# Patient Record
Sex: MALE | Race: WHITE | NOT HISPANIC OR LATINO | Employment: FULL TIME | URBAN - METROPOLITAN AREA
[De-identification: names, ages, dates, MRNs, and addresses within clinical notes are randomized per-mention and may not be internally consistent; named-entity substitution may affect disease eponyms.]

---

## 2017-08-28 ENCOUNTER — OFFICE VISIT (OUTPATIENT)
Dept: URGENT CARE | Facility: PHYSICIAN GROUP | Age: 22
End: 2017-08-28
Payer: COMMERCIAL

## 2017-08-28 VITALS
DIASTOLIC BLOOD PRESSURE: 68 MMHG | WEIGHT: 145 LBS | HEART RATE: 84 BPM | RESPIRATION RATE: 16 BRPM | BODY MASS INDEX: 21.98 KG/M2 | TEMPERATURE: 98.5 F | OXYGEN SATURATION: 96 % | SYSTOLIC BLOOD PRESSURE: 100 MMHG | HEIGHT: 68 IN

## 2017-08-28 DIAGNOSIS — K04.7 DENTAL INFECTION: ICD-10-CM

## 2017-08-28 PROCEDURE — 99214 OFFICE O/P EST MOD 30 MIN: CPT | Performed by: PHYSICIAN ASSISTANT

## 2017-08-28 RX ORDER — AMOXICILLIN 875 MG/1
875 TABLET, COATED ORAL 2 TIMES DAILY
Qty: 14 TAB | Refills: 0 | Status: SHIPPED | OUTPATIENT
Start: 2017-08-28 | End: 2017-09-04

## 2017-08-28 ASSESSMENT — ENCOUNTER SYMPTOMS
WHEEZING: 0
SHORTNESS OF BREATH: 0
COUGH: 0
EYE PAIN: 0
FEVER: 0
EYE DISCHARGE: 0
CHILLS: 0
PALPITATIONS: 0
HEADACHES: 1
EYE REDNESS: 0

## 2017-08-29 NOTE — PROGRESS NOTES
"Subjective:      Seng Holt is a 21 y.o. male who presents with Tooth Ache (C/o tooth pain, swelling x1 day.  Slight chills)            Dental Pain    This is a new problem. The current episode started today. The problem occurs constantly. The problem has been unchanged. The pain is moderate. Associated symptoms include thermal sensitivity. Pertinent negatives include no fever. He has tried nothing for the symptoms. The treatment provided moderate relief.       Review of Systems   Constitutional: Positive for malaise/fatigue. Negative for chills and fever.   HENT:        Tooth pain     Eyes: Negative for pain, discharge and redness.   Respiratory: Negative for cough, shortness of breath and wheezing.    Cardiovascular: Negative for chest pain and palpitations.   Neurological: Positive for headaches.   All other systems reviewed and are negative.    PMH:  has no past medical history on file.  MEDS:   Current Outpatient Prescriptions:   •  amoxicillin (AMOXIL) 875 MG tablet, Take 1 Tab by mouth 2 times a day for 7 days., Disp: 14 Tab, Rfl: 0  •  naproxen (NAPROSYN) 500 MG Tab, Take 1 Tab by mouth 2 times a day, with meals., Disp: 60 Tab, Rfl: 1  ALLERGIES: No Known Allergies  SURGHX: No past surgical history on file.  SOCHX:  reports that he has been smoking Cigarettes.  He has been smoking about 1.00 pack per day. He has never used smokeless tobacco. He reports that he drinks alcohol. He reports that he does not use drugs.  FH: Family history was reviewed, no pertinent findings to report  Medications, Allergies, and current problem list reviewed today in Epic       Objective:     /68   Pulse 84   Temp 36.9 °C (98.5 °F)   Resp 16   Ht 1.727 m (5' 8\")   Wt 65.8 kg (145 lb)   SpO2 96%   BMI 22.05 kg/m²      Physical Exam   Constitutional: He is oriented to person, place, and time. Vital signs are normal. He appears well-developed and well-nourished.   HENT:   Head: Normocephalic and atraumatic. "   Right Ear: Hearing, tympanic membrane, external ear and ear canal normal.   Left Ear: Hearing, tympanic membrane, external ear and ear canal normal.   Nose: Mucosal edema and rhinorrhea present. No sinus tenderness. No epistaxis. Right sinus exhibits maxillary sinus tenderness. Left sinus exhibits maxillary sinus tenderness.   Mouth/Throat: Uvula is midline, oropharynx is clear and moist and mucous membranes are normal. Dental abscesses and dental caries present.       Neck: Normal range of motion. Neck supple.   Cardiovascular: Normal rate, regular rhythm, normal heart sounds and intact distal pulses.    Pulmonary/Chest: Effort normal and breath sounds normal.   Neurological: He is alert and oriented to person, place, and time.   Skin: Skin is warm and dry.   Psychiatric: He has a normal mood and affect. His behavior is normal.   Vitals reviewed.              Assessment/Plan:     1. Dental infection  - amoxicillin    Differential diagnosis, natural history, supportive care, and indications for immediate follow-up discussed at length.   Follow-up with dentist 4-5 days, emergency room precautions discussed.  Patient and/or family appears understanding of information.

## 2018-01-08 ENCOUNTER — NON-PROVIDER VISIT (OUTPATIENT)
Dept: URGENT CARE | Facility: PHYSICIAN GROUP | Age: 23
End: 2018-01-08

## 2018-01-08 DIAGNOSIS — Z02.1 PRE-EMPLOYMENT DRUG SCREENING: ICD-10-CM

## 2018-01-08 LAB
AMP AMPHETAMINE: NORMAL
COC COCAINE: NORMAL
INT CON NEG: NORMAL
INT CON POS: NORMAL
MET METHAMPHETAMINES: NORMAL
OPI OPIATES: NORMAL
PCP PHENCYCLIDINE: NORMAL
POC DRUG COMMENT 753798-OCCUPATIONAL HEALTH: NEGATIVE
THC: NORMAL

## 2018-01-08 PROCEDURE — 80305 DRUG TEST PRSMV DIR OPT OBS: CPT | Performed by: PHYSICIAN ASSISTANT

## 2018-10-03 ENCOUNTER — OFFICE VISIT (OUTPATIENT)
Dept: URGENT CARE | Facility: PHYSICIAN GROUP | Age: 23
End: 2018-10-03
Payer: COMMERCIAL

## 2018-10-03 VITALS
TEMPERATURE: 98.4 F | WEIGHT: 150 LBS | BODY MASS INDEX: 22.73 KG/M2 | SYSTOLIC BLOOD PRESSURE: 96 MMHG | DIASTOLIC BLOOD PRESSURE: 64 MMHG | HEART RATE: 75 BPM | HEIGHT: 68 IN | OXYGEN SATURATION: 96 %

## 2018-10-03 DIAGNOSIS — B35.3 TINEA PEDIS OF BOTH FEET: ICD-10-CM

## 2018-10-03 PROCEDURE — 99214 OFFICE O/P EST MOD 30 MIN: CPT | Performed by: NURSE PRACTITIONER

## 2018-10-03 RX ORDER — NAFTIFINE HYDROCHLORIDE 20 MG/G
1 GEL TOPICAL 2 TIMES DAILY
Qty: 1 TUBE | Refills: 0 | Status: SHIPPED | OUTPATIENT
Start: 2018-10-03 | End: 2022-01-26

## 2018-10-03 RX ORDER — PRENATAL VIT 91/IRON/FOLIC/DHA 28-975-200
COMBINATION PACKAGE (EA) ORAL
Qty: 15 G | Refills: 0 | Status: SHIPPED | OUTPATIENT
Start: 2018-10-03 | End: 2022-01-26

## 2018-10-03 RX ORDER — KETOCONAZOLE 20 MG/G
1 CREAM TOPICAL DAILY
Qty: 1 TUBE | Refills: 0 | Status: SHIPPED | OUTPATIENT
Start: 2018-10-03 | End: 2018-10-03 | Stop reason: SDUPTHER

## 2018-10-03 RX ORDER — KETOCONAZOLE 20 MG/G
1 CREAM TOPICAL DAILY
Qty: 1 TUBE | Refills: 0 | Status: SHIPPED | OUTPATIENT
Start: 2018-10-03 | End: 2022-01-26

## 2018-10-03 ASSESSMENT — ENCOUNTER SYMPTOMS
NAUSEA: 0
CHILLS: 0
SORE THROAT: 0
VOMITING: 0
DIZZINESS: 0
MYALGIAS: 0
SHORTNESS OF BREATH: 0
FEVER: 0
EYE PAIN: 0
ANOREXIA: 0

## 2018-10-03 NOTE — PROGRESS NOTES
Subjective:     Seng Holt is a 22 y.o. male who presents for Rash (Multiple abrasions on both feet for 1 month. Possible cause from work boots. c/o itching, burning and oozing and bleeding. )  Patient presents clinic today for evaluation on both his feet is present for over a month. Patient wears work boots that exacerbate symptoms. They have been itching, burning, oozing. He is applied Vaseline with no relief in symptoms.     Rash   This is a new problem. The current episode started more than 1 month ago. The problem is unchanged. The affected locations include the left foot and right foot. The rash is characterized by itchiness and redness. He was exposed to nothing. Pertinent negatives include no anorexia, eye pain, fever, joint pain, shortness of breath, sore throat or vomiting. Past treatments include anti-itch cream. The treatment provided no relief. There is no history of allergies.   History reviewed. No pertinent past medical history.History reviewed. No pertinent surgical history.  Social History     Social History   • Marital status: Single     Spouse name: N/A   • Number of children: N/A   • Years of education: N/A     Occupational History   • Not on file.     Social History Main Topics   • Smoking status: Current Every Day Smoker     Packs/day: 1.00     Types: Cigarettes   • Smokeless tobacco: Never Used   • Alcohol use Yes      Comment: occasional   • Drug use: No   • Sexual activity: Yes     Partners: Female     Other Topics Concern   • Not on file     Social History Narrative   • No narrative on file    History reviewed. No pertinent family history. Review of Systems   Constitutional: Negative for chills and fever.   HENT: Negative for sore throat.    Eyes: Negative for pain.   Respiratory: Negative for shortness of breath.    Cardiovascular: Negative for chest pain.   Gastrointestinal: Negative for anorexia, nausea and vomiting.   Genitourinary: Negative for hematuria.   Musculoskeletal:  "Negative for joint pain and myalgias.   Skin: Positive for itching and rash.   Neurological: Negative for dizziness.   No Known Allergies   Objective:   BP (!) 96/64 (BP Location: Left arm, Patient Position: Sitting, BP Cuff Size: Adult)   Pulse 75   Temp 36.9 °C (98.4 °F) (Temporal)   Ht 1.727 m (5' 8\")   Wt 68 kg (150 lb)   SpO2 96%   BMI 22.81 kg/m²   Physical Exam   Constitutional: He is oriented to person, place, and time. He appears well-developed and well-nourished. No distress.   HENT:   Head: Normocephalic and atraumatic.   Eyes: Pupils are equal, round, and reactive to light. Conjunctivae and EOM are normal.   Cardiovascular: Normal rate and regular rhythm.    No murmur heard.  Pulmonary/Chest: Effort normal and breath sounds normal. No respiratory distress.   Abdominal: Soft. He exhibits no distension. There is no tenderness.   Neurological: He is alert and oriented to person, place, and time. He has normal reflexes. No sensory deficit.   Skin: Skin is warm and dry. Abrasion and rash noted.        Psychiatric: He has a normal mood and affect.         Assessment/Plan:   Assessment    1. Tinea pedis of both feet  - ketoconazole (NIZORAL) 2 % Cream; Apply 1 Application to affected area(s) every day.  Dispense: 1 Tube; Refill: 0  - Naftifine HCl 2 % Gel; 1 Application by Apply externally route 2 Times a Day.  Dispense: 1 Tube; Refill: 0    Differential diagnosis, natural history, supportive care, and indications for immediate follow-up discussed.  "

## 2018-10-03 NOTE — LETTER
October 3, 2018         Patient: Seng Holt   YOB: 1995   Date of Visit: 10/3/2018           To Whom it May Concern:    Seng Holt was seen in my clinic on 10/3/2018. He may return to work on 10/05/18.    If you have any questions or concerns, please don't hesitate to call.        Sincerely,           YOVANA Rowe  Electronically Signed

## 2019-01-04 ENCOUNTER — APPOINTMENT (OUTPATIENT)
Dept: URGENT CARE | Facility: PHYSICIAN GROUP | Age: 24
End: 2019-01-04
Payer: COMMERCIAL

## 2019-01-05 ENCOUNTER — OFFICE VISIT (OUTPATIENT)
Dept: URGENT CARE | Facility: PHYSICIAN GROUP | Age: 24
End: 2019-01-05
Payer: COMMERCIAL

## 2019-01-05 VITALS
DIASTOLIC BLOOD PRESSURE: 76 MMHG | SYSTOLIC BLOOD PRESSURE: 114 MMHG | HEART RATE: 68 BPM | BODY MASS INDEX: 23.42 KG/M2 | WEIGHT: 154 LBS | RESPIRATION RATE: 16 BRPM | TEMPERATURE: 98.9 F | OXYGEN SATURATION: 98 %

## 2019-01-05 DIAGNOSIS — L03.011 PARONYCHIA OF RIGHT INDEX FINGER: Primary | ICD-10-CM

## 2019-01-05 DIAGNOSIS — J02.9 SORE THROAT: ICD-10-CM

## 2019-01-05 DIAGNOSIS — J02.0 STREP PHARYNGITIS: ICD-10-CM

## 2019-01-05 LAB
INT CON NEG: NORMAL
INT CON POS: NORMAL
S PYO AG THROAT QL: POSITIVE

## 2019-01-05 PROCEDURE — 99214 OFFICE O/P EST MOD 30 MIN: CPT | Performed by: INTERNAL MEDICINE

## 2019-01-05 PROCEDURE — 87880 STREP A ASSAY W/OPTIC: CPT | Performed by: INTERNAL MEDICINE

## 2019-01-05 RX ORDER — CEPHALEXIN 500 MG/1
500 CAPSULE ORAL 4 TIMES DAILY
Qty: 40 CAP | Refills: 0 | Status: SHIPPED | OUTPATIENT
Start: 2019-01-05 | End: 2019-01-15

## 2019-01-05 ASSESSMENT — ENCOUNTER SYMPTOMS
PSYCHIATRIC NEGATIVE: 1
SENSORY CHANGE: 0
NEUROLOGICAL NEGATIVE: 1
SHORTNESS OF BREATH: 0
MUSCULOSKELETAL NEGATIVE: 1
TINGLING: 0
DIZZINESS: 0
EYES NEGATIVE: 1
COUGH: 1
CONSTITUTIONAL NEGATIVE: 1
FOCAL WEAKNESS: 0
GASTROINTESTINAL NEGATIVE: 1
SORE THROAT: 1
CARDIOVASCULAR NEGATIVE: 1
FEVER: 0
WEAKNESS: 0

## 2019-01-05 NOTE — PROGRESS NOTES
Subjective:     Seng Holt is a 23 y.o. male who presents for Finger Pain (infection right hand ) and Pharyngitis (x 2 weeks )       Pharyngitis    This is a new problem. There has been no fever. Associated symptoms include coughing. Pertinent negatives include no shortness of breath.   The patient is a 23-year-old male who comes into urgent care today with a chief complaint of pharyngitis times 2 weeks which has gradually been worsening.  Associated symptoms include a cough.  Patient has not tried any medication for the pain.  Patient states his girlfriend also was having a sore throat.    Patient also reporting a possible infection in his right hand.  States he was using new gloves and started to notice pain swelling and redness at the tip of his index finger surrounding his fingernail.  Patient reports that there has been some purulent drainage seeping out from the side of his fingernail.  Reports the pain is severe. Denies numbness, tingling, or focal weakness.    PMH:  has no past medical history on file.    MEDS:   Current Outpatient Prescriptions:   •  cephALEXin (KEFLEX) 500 MG Cap, Take 1 Cap by mouth 4 times a day for 10 days., Disp: 40 Cap, Rfl: 0  •  Naftifine HCl 2 % Gel, 1 Application by Apply externally route 2 Times a Day. (Patient not taking: Reported on 1/5/2019), Disp: 1 Tube, Rfl: 0  •  terbinafine (LAMISIL) 1 % cream, Apply twice a day to affected areas for 1-3 weeks. (Patient not taking: Reported on 1/5/2019), Disp: 15 g, Rfl: 0  •  ketoconazole (NIZORAL) 2 % Cream, Apply 1 Application to affected area(s) every day. (Patient not taking: Reported on 1/5/2019), Disp: 1 Tube, Rfl: 0    ALLERGIES: No Known Allergies    SURGHX: No past surgical history on file.    SOCHX:  reports that he has been smoking Cigarettes.  He has been smoking about 1.00 pack per day. He has never used smokeless tobacco. He reports that he drinks alcohol. He reports that he does not use drugs.    FH: Reviewed with  patient, not pertinent to this visit.     Review of Systems   Constitutional: Negative.  Negative for fever and malaise/fatigue.   HENT: Positive for sore throat.    Eyes: Negative.    Respiratory: Positive for cough. Negative for shortness of breath.    Cardiovascular: Negative.  Negative for chest pain.   Gastrointestinal: Negative.    Genitourinary: Negative.    Musculoskeletal: Negative.    Skin:        Redness, swelling, tenderness surrounding fingernail of R index finger   Neurological: Negative.  Negative for dizziness, tingling, sensory change, focal weakness and weakness.   Psychiatric/Behavioral: Negative.    All other systems reviewed and are negative.    Objective:     /76   Pulse 68   Temp 37.2 °C (98.9 °F) (Temporal)   Resp 16   Wt 69.9 kg (154 lb)   SpO2 98%   BMI 23.42 kg/m²     Physical Exam   Constitutional: He is oriented to person, place, and time. He appears well-developed and well-nourished. He is cooperative. No distress.   HENT:   Head: Normocephalic.   Right Ear: Tympanic membrane normal.   Left Ear: Tympanic membrane normal.   Nose: Nose normal.   Mouth/Throat: Uvula is midline and mucous membranes are normal. Posterior oropharyngeal edema and posterior oropharyngeal erythema present. No oropharyngeal exudate. Tonsils are 1+ on the right. Tonsils are 1+ on the left.   Eyes: Conjunctivae and EOM are normal.   Neck: Normal range of motion.   Cardiovascular: Normal rate, regular rhythm, normal heart sounds and normal pulses.    Pulmonary/Chest: Effort normal and breath sounds normal. No respiratory distress.   Abdominal: Soft. Bowel sounds are normal.   Musculoskeletal: Normal range of motion. He exhibits no deformity.   Lymphadenopathy:     He has no cervical adenopathy.   Neurological: He is alert and oriented to person, place, and time. He has normal strength. No sensory deficit.   Skin: Skin is warm and dry. Capillary refill takes less than 2 seconds.   Erythema, warmth,  tenderness, swelling with small area of fluctuance surrounding fingernail of R index finger, no drainage   Psychiatric: He has a normal mood and affect.   Vitals reviewed.    Rapid strep A swab: positive       Assessment/Plan:     1. Paronychia of right index finger  - cephALEXin (KEFLEX) 500 MG Cap; Take 1 Cap by mouth 4 times a day for 10 days.  Dispense: 40 Cap; Refill: 0    2. Strep pharyngitis  - cephALEXin (KEFLEX) 500 MG Cap; Take 1 Cap by mouth 4 times a day for 10 days.  Dispense: 40 Cap; Refill: 0    3. Sore throat  - POCT Rapid Strep A    I&D was recommended but patient declines at this time as he is currently on the time constraint. Patient advised to monitor his finger and to return if his symptoms worsen.    Discussed supportive measures including increasing fluids and rest as well as OTC symptom management including acetaminophen and/or ibuprofen PRN pain and/or fever. Avoid close oral contact. Rx sent electronically for Keflex.    Patient advised to: Return for 1) Symptoms don't improve or worsen, or go to ER, 2) Follow up with primary care in 7-10 days.    Differential diagnosis, natural history, supportive care, and indications for immediate follow-up discussed. All questions answered. Patient agrees with the plan of care.    Case and results reviewed and agree with treatment plan as outlined.  Dr. Calle

## 2019-01-05 NOTE — PATIENT INSTRUCTIONS
Paronychia  Introduction  Paronychia is an infection of the skin that surrounds a nail. It usually affects the skin around a fingernail, but it may also occur near a toenail. It often causes pain and swelling around the nail. This condition may come on suddenly or develop over a longer period. In some cases, a collection of pus (abscess) can form near or under the nail. Usually, paronychia is not serious and it clears up with treatment.  What are the causes?  This condition may be caused by bacteria or fungi. It is commonly caused by either Streptococcus or Staphylococcus bacteria. The bacteria or fungi often cause the infection by getting into the affected area through an opening in the skin, such as a cut or a hangnail.  What increases the risk?  This condition is more likely to develop in:  · People who get their hands wet often, such as those who work as dishwashers, , or nurses.  · People who bite their fingernails or suck their thumbs.  · People who trim their nails too short.  · People who have hangnails or injured fingertips.  · People who get manicures.  · People who have diabetes.  What are the signs or symptoms?  Symptoms of this condition include:  · Redness and swelling of the skin near the nail.  · Tenderness around the nail when you touch the area.  · Pus-filled bumps under the cuticle. The cuticle is the skin at the base or sides of the nail.  · Fluid or pus under the nail.  · Throbbing pain in the area.  How is this diagnosed?  This condition is usually diagnosed with a physical exam. In some cases, a sample of pus may be taken from an abscess to be tested in a lab. This can help to determine what type of bacteria or fungi is causing the condition.  How is this treated?  Treatment for this condition depends on the cause and severity of the condition. If the condition is mild, it may clear up on its own in a few days. Your health care provider may recommend soaking the affected area in warm  water a few times a day. When treatment is needed, the options may include:  · Antibiotic medicine, if the condition is caused by a bacterial infection.  · Antifungal medicine, if the condition is caused by a fungal infection.  · Incision and drainage, if an abscess is present. In this procedure, the health care provider will cut open the abscess so the pus can drain out.  Follow these instructions at home:  · Soak the affected area in warm water if directed to do so by your health care provider. You may be told to do this for 20 minutes, 2-3 times a day. Keep the area dry in between soakings.  · Take medicines only as directed by your health care provider.  · If you were prescribed an antibiotic medicine, finish all of it even if you start to feel better.  · Keep the affected area clean.  · Do not try to drain a fluid-filled bump yourself.  · If you will be washing dishes or performing other tasks that require your hands to get wet, wear rubber gloves. You should also wear gloves if your hands might come in contact with irritating substances, such as  or chemicals.  · Follow your health care provider's instructions about:  ¨ Wound care.  ¨ Bandage (dressing) changes and removal.  Contact a health care provider if:  · Your symptoms get worse or do not improve with treatment.  · You have a fever or chills.  · You have redness spreading from the affected area.  · You have continued or increased fluid, blood, or pus coming from the affected area.  · Your finger or knuckle becomes swollen or is difficult to move.  This information is not intended to replace advice given to you by your health care provider. Make sure you discuss any questions you have with your health care provider.  Document Released: 06/13/2002 Document Revised: 05/25/2017 Document Reviewed: 11/25/2015  © 2017 [employer]  Strep Throat  Strep throat is a bacterial infection of the throat. Your health care provider may call the infection  tonsillitis or pharyngitis, depending on whether there is swelling in the tonsils or at the back of the throat. Strep throat is most common during the cold months of the year in children who are 5-15 years of age, but it can happen during any season in people of any age. This infection is spread from person to person (contagious) through coughing, sneezing, or close contact.  What are the causes?  Strep throat is caused by the bacteria called Streptococcus pyogenes.  What increases the risk?  This condition is more likely to develop in:  · People who spend time in crowded places where the infection can spread easily.  · People who have close contact with someone who has strep throat.  What are the signs or symptoms?  Symptoms of this condition include:  · Fever or chills.  · Redness, swelling, or pain in the tonsils or throat.  · Pain or difficulty when swallowing.  · White or yellow spots on the tonsils or throat.  · Swollen, tender glands in the neck or under the jaw.  · Red rash all over the body (rare).  How is this diagnosed?  This condition is diagnosed by performing a rapid strep test or by taking a swab of your throat (throat culture test). Results from a rapid strep test are usually ready in a few minutes, but throat culture test results are available after one or two days.  How is this treated?  This condition is treated with antibiotic medicine.  Follow these instructions at home:  Medicines  · Take over-the-counter and prescription medicines only as told by your health care provider.  · Take your antibiotic as told by your health care provider. Do not stop taking the antibiotic even if you start to feel better.  · Have family members who also have a sore throat or fever tested for strep throat. They may need antibiotics if they have the strep infection.  Eating and drinking  · Do not share food, drinking cups, or personal items that could cause the infection to spread to other people.  · If swallowing is  difficult, try eating soft foods until your sore throat feels better.  · Drink enough fluid to keep your urine clear or pale yellow.  General instructions  · Gargle with a salt-water mixture 3-4 times per day or as needed. To make a salt-water mixture, completely dissolve ½-1 tsp of salt in 1 cup of warm water.  · Make sure that all household members wash their hands well.  · Get plenty of rest.  · Stay home from school or work until you have been taking antibiotics for 24 hours.  · Keep all follow-up visits as told by your health care provider. This is important.  Contact a health care provider if:  · The glands in your neck continue to get bigger.  · You develop a rash, cough, or earache.  · You cough up a thick liquid that is green, yellow-brown, or bloody.  · You have pain or discomfort that does not get better with medicine.  · Your problems seem to be getting worse rather than better.  · You have a fever.  Get help right away if:  · You have new symptoms, such as vomiting, severe headache, stiff or painful neck, chest pain, or shortness of breath.  · You have severe throat pain, drooling, or changes in your voice.  · You have swelling of the neck, or the skin on the neck becomes red and tender.  · You have signs of dehydration, such as fatigue, dry mouth, and decreased urination.  · You become increasingly sleepy, or you cannot wake up completely.  · Your joints become red or painful.  This information is not intended to replace advice given to you by your health care provider. Make sure you discuss any questions you have with your health care provider.  Document Released: 12/15/2001 Document Revised: 08/16/2017 Document Reviewed: 04/11/2016  Elsevier Interactive Patient Education © 2017 [employer]

## 2019-08-26 ENCOUNTER — NON-PROVIDER VISIT (OUTPATIENT)
Dept: URGENT CARE | Facility: PHYSICIAN GROUP | Age: 24
End: 2019-08-26

## 2019-08-26 DIAGNOSIS — Z02.1 PRE-EMPLOYMENT DRUG SCREENING: ICD-10-CM

## 2019-08-26 LAB
AMP AMPHETAMINE: NORMAL
COC COCAINE: NORMAL
INT CON NEG: NORMAL
INT CON POS: NORMAL
MET METHAMPHETAMINES: NORMAL
OPI OPIATES: NORMAL
PCP PHENCYCLIDINE: NORMAL
POC DRUG COMMENT 753798-OCCUPATIONAL HEALTH: NORMAL
THC: NORMAL

## 2019-08-26 PROCEDURE — 80305 DRUG TEST PRSMV DIR OPT OBS: CPT | Performed by: PHYSICIAN ASSISTANT

## 2020-11-18 ENCOUNTER — NON-PROVIDER VISIT (OUTPATIENT)
Dept: URGENT CARE | Facility: PHYSICIAN GROUP | Age: 25
End: 2020-11-18

## 2020-11-18 DIAGNOSIS — Z02.1 PRE-EMPLOYMENT DRUG SCREENING: ICD-10-CM

## 2020-11-18 PROCEDURE — 80305 DRUG TEST PRSMV DIR OPT OBS: CPT | Performed by: FAMILY MEDICINE

## 2022-01-26 ENCOUNTER — OFFICE VISIT (OUTPATIENT)
Dept: URGENT CARE | Facility: PHYSICIAN GROUP | Age: 27
End: 2022-01-26
Payer: MEDICAID

## 2022-01-26 VITALS
RESPIRATION RATE: 16 BRPM | DIASTOLIC BLOOD PRESSURE: 62 MMHG | SYSTOLIC BLOOD PRESSURE: 116 MMHG | BODY MASS INDEX: 22.05 KG/M2 | WEIGHT: 154 LBS | OXYGEN SATURATION: 97 % | HEART RATE: 80 BPM | TEMPERATURE: 98.6 F | HEIGHT: 70 IN

## 2022-01-26 DIAGNOSIS — T15.91XA FOREIGN BODY OF RIGHT EYE, INITIAL ENCOUNTER: ICD-10-CM

## 2022-01-26 DIAGNOSIS — H18.891 CORNEAL RUST RING OF RIGHT EYE: ICD-10-CM

## 2022-01-26 PROCEDURE — 99203 OFFICE O/P NEW LOW 30 MIN: CPT | Performed by: FAMILY MEDICINE

## 2022-01-26 ASSESSMENT — ENCOUNTER SYMPTOMS
WEIGHT LOSS: 0
MYALGIAS: 0
VOMITING: 0
NAUSEA: 0

## 2022-01-27 NOTE — PROGRESS NOTES
"Subjective     Seng Holt is a 26 y.o. male who presents with Eye Problem (x -RT eye , something in the eye-)            3-4 days right eye FB sensation/possible metal from grinding. Some itching. No contact lens use. +clear drainage. +photophobia. No vision loss. No other aggravating or alleviating factors.        Review of Systems   Constitutional: Negative for malaise/fatigue and weight loss.   Gastrointestinal: Negative for nausea and vomiting.   Musculoskeletal: Negative for joint pain and myalgias.   Skin: Negative for itching and rash.              Objective     /62   Pulse 80   Temp 37 °C (98.6 °F) (Temporal)   Resp 16   Ht 1.778 m (5' 10\")   Wt 69.9 kg (154 lb)   SpO2 97%   BMI 22.10 kg/m²      Physical Exam  Constitutional:       General: He is not in acute distress.     Appearance: He is well-developed.   HENT:      Head: Normocephalic and atraumatic.   Eyes:     Skin:     General: Skin is warm and dry.      Findings: No rash.   Neurological:      Mental Status: He is alert and oriented to person, place, and time.                             Assessment & Plan             1. Foreign body of right eye, initial encounter  Referral to Ophthalmology   2. Corneal rust ring of right eye  Referral to Ophthalmology     Differential diagnosis, natural history, supportive care, and indications for immediate follow-up discussed at length.             "

## 2022-06-21 ENCOUNTER — OFFICE VISIT (OUTPATIENT)
Dept: URGENT CARE | Facility: PHYSICIAN GROUP | Age: 27
End: 2022-06-21
Payer: MEDICAID

## 2022-06-21 ENCOUNTER — APPOINTMENT (OUTPATIENT)
Dept: RADIOLOGY | Facility: IMAGING CENTER | Age: 27
End: 2022-06-21
Attending: STUDENT IN AN ORGANIZED HEALTH CARE EDUCATION/TRAINING PROGRAM
Payer: MEDICAID

## 2022-06-21 VITALS
DIASTOLIC BLOOD PRESSURE: 82 MMHG | OXYGEN SATURATION: 96 % | BODY MASS INDEX: 21.24 KG/M2 | SYSTOLIC BLOOD PRESSURE: 108 MMHG | TEMPERATURE: 97.9 F | HEART RATE: 96 BPM | RESPIRATION RATE: 16 BRPM | WEIGHT: 148 LBS

## 2022-06-21 DIAGNOSIS — S69.91XA INJURY OF RIGHT LITTLE FINGER, INITIAL ENCOUNTER: ICD-10-CM

## 2022-06-21 DIAGNOSIS — M25.441 FINGER JOINT SWELLING, RIGHT: ICD-10-CM

## 2022-06-21 PROCEDURE — 99213 OFFICE O/P EST LOW 20 MIN: CPT | Performed by: STUDENT IN AN ORGANIZED HEALTH CARE EDUCATION/TRAINING PROGRAM

## 2022-06-21 PROCEDURE — 73140 X-RAY EXAM OF FINGER(S): CPT | Mod: TC,FY,RT | Performed by: STUDENT IN AN ORGANIZED HEALTH CARE EDUCATION/TRAINING PROGRAM

## 2022-06-21 NOTE — PROGRESS NOTES
Subjective:   Seng Holt is a 26 y.o. male who presents for Wound Infection (RADHA barth, possible infection )      HPI:  Pleasant 26-year-old male presents the clinic swelling of his right little finger.  He states that he first noticed this approximately 3 weeks ago.  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  He reports that over the past 3 weeks it has gotten a little bit bigger and has become hard.  He states it is not painful and he has full range of motion.  He states that he has not been able to feel if there is a foreign body in there but is unsure why he is having the swelling on his finger.  He is unsure of any other injury but states that he does occasionally hit it at work.  Patient denies fever, chills, diaphoresis, rash, chest pain, palpitations, numbness, tingling, burning, pain, shortness of breath, wheezing, nausea, vomiting, abdominal pain, laceration, increased warmth, drainage, or pus from the area of swelling.      Medications:    • This patient does not have an active medication from one of the medication groupers.    Allergies: Patient has no known allergies.    Problem List: Seng Holt does not have any pertinent problems on file.    Surgical History:  No past surgical history on file.    Past Social Hx: Seng Holt  reports that he has been smoking cigarettes. He has been smoking about 1.00 pack per day. He has never used smokeless tobacco. He reports current alcohol use. He reports that he does not use drugs.     Past Family Hx:  Seng Holt family history is not on file.     Problem list, medications, and allergies reviewed by myself today in Epic.     Objective:     /82   Pulse 96   Temp 36.6 °C (97.9 °F) (Temporal)   Resp 16   Wt 67.1 kg (148 lb)   SpO2 96%   BMI 21.24 kg/m²     Physical Exam  Vitals reviewed.   Cardiovascular:      Rate and Rhythm: Normal rate and regular rhythm.      Pulses:  Normal pulses.      Heart sounds: Normal heart sounds. No murmur heard.  Pulmonary:      Effort: Pulmonary effort is normal. No respiratory distress.      Breath sounds: Normal breath sounds. No stridor. No wheezing, rhonchi or rales.   Musculoskeletal:      Left hand: Normal.      Comments: Forest sized swelling over the posterior aspect of the right fifth finger PIP joint.  Small scab overlying the center of the swelling.  Swelling is very firm and shows no fluctuance.  No signs of abscess.  Mass is not mobile.  No erythema, ecchymosis, increased warmth, or signs of infection.  Full active and passive range of motion of the finger at all joints in the hand.  No tenderness to palpation.  Sensation intact.  Cap refill less than 2 seconds.  2+ radial pulse.  No foreign body observed visually.  No foreign body was appreciated during palpation.   Skin:     General: Skin is warm and dry.      Capillary Refill: Capillary refill takes less than 2 seconds.   Neurological:      General: No focal deficit present.      Mental Status: He is oriented to person, place, and time.         RADIOLOGY RESULTS   DX-FINGER(S) 2+ RIGHT    Result Date: 6/21/2022 6/21/2022 1:13 PM HISTORY/REASON FOR EXAM:  swelling over right fifth finger PIP joint (possible foreign body per patient). . TECHNIQUE/EXAM DESCRIPTION AND NUMBER OF VIEWS:  3 views of the RIGHT fingers. COMPARISON: None FINDINGS: There is dorsal soft tissue swelling at the level of the right 5th proximal interphalangeal joint. There is no opaque foreign body or underlying bony abnormality. There is no fracture. Alignment is normal. No degenerative changes are present.     1.  Dorsal soft tissue swelling at the level of the right 5th proximal interphalangeal joint 2.  No foreign body or other opaque abnormality           Assessment/Plan:     Diagnosis and associated orders:     1. Injury of right little finger, initial encounter  DX-FINGER(S) 2+ RIGHT    Referral to Hand  Surgery   2. Finger joint swelling, right  Referral to Hand Surgery      Comments/MDM:     • X-ray of the hand shows dorsal soft tissues swelling at the level of the right fifth proximal interphalangeal joint.  No foreign body or other opaque abnormality observed.  No osseous abnormality.  No degenerative changes.  • Patient presents with clinical exam findings consistent with possible foreign body reaction.  Other possible differential diagnosis is a mucous cyst.  Patient states he does often hit his finger while at work.  There is no fluctuance and this is not a mobile mass.  Making mucous cyst potentially less likely and foreign body a more likely possibility.  Discussed with patient that organic material such as wood would not accurately show up on x-ray.  Discussed concern with the patient over opening of the wound and exploring without known foreign body due to increased chance of infection.  Patient does not have any signs infection at this time.  Patient's vitals are all within normal limits and he is afebrile.  The swelling has been present for 3 weeks and has been unchanging for least the past 7 days.  • Patient was given referral to hand surgery for further evaluation and management.  • ED/return precautions were given.  Patient has good understanding of the signs and symptoms that warrant immediate reevaluation.         Differential diagnosis, natural history, supportive care, and indications for immediate follow-up discussed.    Advised the patient to follow-up with the primary care physician for recheck, reevaluation, and consideration of further management.    Please note that this dictation was created using voice recognition software. I have made a reasonable attempt to correct obvious errors, but I expect that there are errors of grammar and possibly content that I did not discover before finalizing the note.    Electronically signed by Celso Omer PA-C.

## 2022-11-10 ENCOUNTER — OFFICE VISIT (OUTPATIENT)
Dept: URGENT CARE | Facility: PHYSICIAN GROUP | Age: 27
End: 2022-11-10

## 2022-11-10 VITALS
HEIGHT: 70 IN | TEMPERATURE: 98.3 F | BODY MASS INDEX: 21.47 KG/M2 | OXYGEN SATURATION: 94 % | HEART RATE: 90 BPM | WEIGHT: 150 LBS | SYSTOLIC BLOOD PRESSURE: 116 MMHG | DIASTOLIC BLOOD PRESSURE: 80 MMHG | RESPIRATION RATE: 16 BRPM

## 2022-11-10 DIAGNOSIS — Z02.1 PHYSICAL EXAM, PRE-EMPLOYMENT: ICD-10-CM

## 2022-11-10 DIAGNOSIS — Z02.1 PRE-EMPLOYMENT DRUG SCREENING: ICD-10-CM

## 2022-11-10 PROCEDURE — 80305 DRUG TEST PRSMV DIR OPT OBS: CPT | Performed by: NURSE PRACTITIONER

## 2022-11-10 PROCEDURE — 8915 PR COMPREHENSIVE PHYSICAL: Performed by: NURSE PRACTITIONER

## 2022-11-10 NOTE — PROGRESS NOTES
"  Subjective:     Seng Holt is a 26 y.o. male who presents for Employment Physical (Px/UDS)      HPI    No past medical history on file.    No past surgical history on file.    Social History     Socioeconomic History    Marital status: Single     Spouse name: Not on file    Number of children: Not on file    Years of education: Not on file    Highest education level: Not on file   Occupational History    Not on file   Tobacco Use    Smoking status: Every Day     Packs/day: 1.00     Types: Cigarettes    Smokeless tobacco: Never   Substance and Sexual Activity    Alcohol use: Yes     Comment: occasional    Drug use: No    Sexual activity: Yes     Partners: Female   Other Topics Concern    Not on file   Social History Narrative    Not on file     Social Determinants of Health     Financial Resource Strain: Not on file   Food Insecurity: Not on file   Transportation Needs: Not on file   Physical Activity: Not on file   Stress: Not on file   Social Connections: Not on file   Intimate Partner Violence: Not on file   Housing Stability: Not on file        No family history on file.     No Known Allergies    Review of Systems   All other systems reviewed and are negative.     Objective:   /80   Pulse 90   Temp 36.8 °C (98.3 °F) (Temporal)   Resp 16   Ht 1.778 m (5' 10\")   Wt 68 kg (150 lb)   SpO2 94%   BMI 21.52 kg/m²     Physical Exam  Vitals reviewed.   Constitutional:       General: He is not in acute distress.     Appearance: He is well-developed.   HENT:      Head: Normocephalic and atraumatic.      Right Ear: External ear normal.      Left Ear: External ear normal.      Nose: Nose normal.      Mouth/Throat:      Mouth: Mucous membranes are moist.   Eyes:      Conjunctiva/sclera: Conjunctivae normal.   Cardiovascular:      Rate and Rhythm: Normal rate.   Pulmonary:      Effort: Pulmonary effort is normal.      Breath sounds: Normal breath sounds.   Musculoskeletal:         General: Normal range of " motion.      Cervical back: Normal range of motion and neck supple.   Skin:     General: Skin is warm and dry.      Findings: Lesion present. No rash.      Comments: Multiple scabbed lesions to extremities/face.   Neurological:      General: No focal deficit present.      Mental Status: He is alert and oriented to person, place, and time.      GCS: GCS eye subscore is 4. GCS verbal subscore is 5. GCS motor subscore is 6.   Psychiatric:         Speech: Speech normal.         Behavior: Behavior normal.         Thought Content: Thought content normal.         Judgment: Judgment normal.       Assessment/Plan:   1. Pre-employment drug screening  - POCT 6 Panel Urine Drug Screen    2. Physical exam, pre-employment    -Noted scanned documentation.    Differential diagnosis, natural history, supportive care, and indications for immediate follow-up discussed.

## 2022-12-14 ENCOUNTER — HOSPITAL ENCOUNTER (OUTPATIENT)
Facility: MEDICAL CENTER | Age: 27
End: 2022-12-14
Attending: NURSE PRACTITIONER
Payer: MEDICAID

## 2022-12-14 ENCOUNTER — OFFICE VISIT (OUTPATIENT)
Dept: URGENT CARE | Facility: PHYSICIAN GROUP | Age: 27
End: 2022-12-14
Payer: MEDICAID

## 2022-12-14 VITALS
SYSTOLIC BLOOD PRESSURE: 112 MMHG | BODY MASS INDEX: 21.52 KG/M2 | OXYGEN SATURATION: 94 % | RESPIRATION RATE: 16 BRPM | WEIGHT: 150 LBS | HEART RATE: 100 BPM | DIASTOLIC BLOOD PRESSURE: 98 MMHG | TEMPERATURE: 98 F

## 2022-12-14 DIAGNOSIS — Z20.822 SUSPECTED COVID-19 VIRUS INFECTION: ICD-10-CM

## 2022-12-14 DIAGNOSIS — J02.9 PHARYNGITIS, UNSPECIFIED ETIOLOGY: ICD-10-CM

## 2022-12-14 DIAGNOSIS — R68.89 FLU-LIKE SYMPTOMS: ICD-10-CM

## 2022-12-14 LAB
INT CON NEG: NORMAL
INT CON POS: NORMAL
S PYO AG THROAT QL: NORMAL

## 2022-12-14 PROCEDURE — 87880 STREP A ASSAY W/OPTIC: CPT | Performed by: NURSE PRACTITIONER

## 2022-12-14 PROCEDURE — 99213 OFFICE O/P EST LOW 20 MIN: CPT | Mod: CS | Performed by: NURSE PRACTITIONER

## 2022-12-14 PROCEDURE — 0240U HCHG SARS-COV-2 COVID-19 NFCT DS RESP RNA 3 TRGT MIC: CPT

## 2022-12-14 ASSESSMENT — ENCOUNTER SYMPTOMS
SHORTNESS OF BREATH: 1
SORE THROAT: 1
NAUSEA: 0
FEVER: 1
DIARRHEA: 1
CHILLS: 1
HEADACHES: 1
MYALGIAS: 1
COUGH: 1
VOMITING: 0

## 2022-12-14 NOTE — PROGRESS NOTES
Subjective:     Seng Holt is a 26 y.o. male who presents for URI (x3days)      URI   This is a new problem. The current episode started in the past 7 days (Fever, chills, myalgias, cough, congestion, sore throat). Associated symptoms include congestion, coughing, diarrhea, headaches and a sore throat. Pertinent negatives include no nausea or vomiting. Treatments tried: Theraflu. The treatment provided mild relief.       Review of Systems   Constitutional:  Positive for chills, fever and malaise/fatigue.   HENT:  Positive for congestion and sore throat.    Respiratory:  Positive for cough and shortness of breath.    Gastrointestinal:  Positive for diarrhea. Negative for nausea and vomiting.   Musculoskeletal:  Positive for myalgias.   Neurological:  Positive for headaches.     PMH: No past medical history on file.  ALLERGIES: No Known Allergies  SURGHX: No past surgical history on file.  SOCHX:   Social History     Socioeconomic History    Marital status: Single   Tobacco Use    Smoking status: Every Day     Packs/day: 1.00     Types: Cigarettes    Smokeless tobacco: Never   Substance and Sexual Activity    Alcohol use: Yes     Comment: occasional    Drug use: No    Sexual activity: Yes     Partners: Female     FH: No family history on file.      Objective:   BP (!) 112/98   Pulse 100   Temp 36.7 °C (98 °F) (Temporal)   Resp 16   Wt 68 kg (150 lb)   SpO2 94%   BMI 21.52 kg/m²     Physical Exam  Vitals and nursing note reviewed.   Constitutional:       General: He is not in acute distress.     Appearance: Normal appearance. He is ill-appearing.   HENT:      Head: Normocephalic and atraumatic.      Right Ear: Tympanic membrane, ear canal and external ear normal. There is no impacted cerumen.      Left Ear: Tympanic membrane, ear canal and external ear normal. There is no impacted cerumen.      Nose: Congestion present. No rhinorrhea.      Mouth/Throat:      Mouth: Mucous membranes are moist.      Pharynx:  Posterior oropharyngeal erythema present. No oropharyngeal exudate.   Eyes:      Extraocular Movements: Extraocular movements intact.      Pupils: Pupils are equal, round, and reactive to light.   Cardiovascular:      Rate and Rhythm: Regular rhythm. Tachycardia present.      Pulses: Normal pulses.      Heart sounds: Normal heart sounds.   Pulmonary:      Effort: Pulmonary effort is normal. No respiratory distress.      Breath sounds: Normal breath sounds. No stridor. No wheezing, rhonchi or rales.   Chest:      Chest wall: No tenderness.   Abdominal:      General: Abdomen is flat. Bowel sounds are normal.      Palpations: Abdomen is soft.      Tenderness: There is no abdominal tenderness. There is no right CVA tenderness or left CVA tenderness.   Musculoskeletal:         General: Normal range of motion.      Cervical back: Normal range of motion and neck supple. No tenderness.   Lymphadenopathy:      Cervical: No cervical adenopathy.   Skin:     General: Skin is warm and dry.      Capillary Refill: Capillary refill takes less than 2 seconds.   Neurological:      General: No focal deficit present.      Mental Status: He is alert and oriented to person, place, and time. Mental status is at baseline.   Psychiatric:         Mood and Affect: Mood normal.         Behavior: Behavior normal.         Thought Content: Thought content normal.         Judgment: Judgment normal.     Poct strep: negative  Assessment/Plan:   Assessment    1. Pharyngitis, unspecified etiology  POCT Rapid Strep A      2. Flu-like symptoms  POCT Rapid Strep A    CoV-2 and Flu A/B by PCR (24 hour In-House): Collect NP swab in VTM      3. Suspected COVID-19 virus infection  POCT Rapid Strep A    CoV-2 and Flu A/B by PCR (24 hour In-House): Collect NP swab in VTM      's and is in the clinic with him as well today however, his son's illness has been ongoing for the past 2 weeks.  Differential diagnoses discussed with patient.  I do believe that his  symptoms are more flu/COVID related.  Strep was negative in the clinic today.  PCR testing is in place.  I will notify him of results.  Work note provided today. We discussed supportive measures including humidifier, warm salt water gargles, over-the-counter Cepacol throat lozenges, rest  and increased fluids. Pt was encouraged to seek treatment back in the ER or urgent care for worsening symptoms,  fever greater than 100.5, wheezes or shortness of breath.        AVS handout given and reviewed with patient. Pt educated on red flags and when to seek treatment back in ER or UC.

## 2022-12-15 DIAGNOSIS — R68.89 FLU-LIKE SYMPTOMS: ICD-10-CM

## 2022-12-15 DIAGNOSIS — Z20.822 SUSPECTED COVID-19 VIRUS INFECTION: ICD-10-CM

## 2022-12-16 LAB
FLUAV RNA SPEC QL NAA+PROBE: POSITIVE
FLUBV RNA SPEC QL NAA+PROBE: NEGATIVE
SARS-COV-2 RNA RESP QL NAA+PROBE: NOTDETECTED
SPECIMEN SOURCE: ABNORMAL
